# Patient Record
Sex: FEMALE | Race: WHITE | NOT HISPANIC OR LATINO
[De-identification: names, ages, dates, MRNs, and addresses within clinical notes are randomized per-mention and may not be internally consistent; named-entity substitution may affect disease eponyms.]

---

## 2019-08-28 PROBLEM — Z00.00 ENCOUNTER FOR PREVENTIVE HEALTH EXAMINATION: Status: ACTIVE | Noted: 2019-08-28

## 2019-08-30 ENCOUNTER — APPOINTMENT (OUTPATIENT)
Dept: GYNECOLOGIC ONCOLOGY | Facility: CLINIC | Age: 49
End: 2019-08-30
Payer: COMMERCIAL

## 2019-08-30 VITALS
RESPIRATION RATE: 16 BRPM | DIASTOLIC BLOOD PRESSURE: 96 MMHG | HEIGHT: 60 IN | SYSTOLIC BLOOD PRESSURE: 153 MMHG | WEIGHT: 163 LBS | HEART RATE: 79 BPM | BODY MASS INDEX: 32 KG/M2 | OXYGEN SATURATION: 97 %

## 2019-08-30 DIAGNOSIS — Z86.018 PERSONAL HISTORY OF OTHER BENIGN NEOPLASM: ICD-10-CM

## 2019-08-30 DIAGNOSIS — Z78.9 OTHER SPECIFIED HEALTH STATUS: ICD-10-CM

## 2019-08-30 DIAGNOSIS — Z80.3 FAMILY HISTORY OF MALIGNANT NEOPLASM OF BREAST: ICD-10-CM

## 2019-08-30 DIAGNOSIS — Z86.59 PERSONAL HISTORY OF OTHER MENTAL AND BEHAVIORAL DISORDERS: ICD-10-CM

## 2019-08-30 DIAGNOSIS — Z86.79 PERSONAL HISTORY OF OTHER DISEASES OF THE CIRCULATORY SYSTEM: ICD-10-CM

## 2019-08-30 PROCEDURE — 99245 OFF/OP CONSLTJ NEW/EST HI 55: CPT | Mod: 25

## 2019-08-30 PROCEDURE — 76857 US EXAM PELVIC LIMITED: CPT | Mod: 59

## 2019-08-30 PROCEDURE — 76830 TRANSVAGINAL US NON-OB: CPT | Mod: 59

## 2019-08-30 RX ORDER — AMLODIPINE BESYLATE 5 MG/1
TABLET ORAL
Refills: 0 | Status: ACTIVE | COMMUNITY

## 2019-08-30 RX ORDER — ESCITALOPRAM OXALATE 5 MG/1
TABLET, FILM COATED ORAL
Refills: 0 | Status: ACTIVE | COMMUNITY

## 2019-09-04 NOTE — CHIEF COMPLAINT
[FreeTextEntry1] : [facility]\par [place] [facility]\par [address] [address] [facility] [facility] [address]\[place] [address]\par

## 2019-09-04 NOTE — HISTORY OF PRESENT ILLNESS
[FreeTextEntry1] : 49 yo  via C/S, LMP , referred by Dr. Norwood, she is a previous patient of mine last seen 2013 for a large 16 week benign fibroid uterus. S/p laparoscopic supracervical hysterectomy and cystoscopy 13. She had seen her GYN for annual visit, per the pt on physical examination the patient had a polyp visualized, she was recommended to return to the office for US. US done 19 which displayed 2 solid masses on each side of the cervix, resembling fibroids in texture. Unable to visualize ovaries. Patient reports she has healed well from her previous surgery with me, she had no complications or issues with healing from surgery. She reports sexual activity and dyspareunia but reports that her dyspareunia was present prior to surgery.  Admits to vaginal discharge which was mostly white with a brown streak after PAP. Admits to having urinary frequency x 1 year, as well as stress incontinence with coughing/sneezing/laughing. Denies pelvic pain, vaginal bleeding, bloating, unintentional weight loss.\par \par Patient reports a history of DVT in L leg when she was pregnant, for which she had stent placed. \par \par Lpap: 2019 Results pending\par Lmammo: 2019 stable abnormalities in L breast, patient returns q 6 months\par Lcolonoscopy:has script

## 2019-09-04 NOTE — END OF VISIT
[FreeTextEntry3] : Written by Hallie Jay PA-C, acting as a scribe for Dr. Hemant Stewart.\par This note accurately reflects the work and decisions made by me.\par

## 2019-09-04 NOTE — PHYSICAL EXAM
[Absent] : Uterus: Absent [Normal] : Bimanual Exam: Normal [de-identified] : Cervical polyp palpated on exam. [de-identified] : Patient was seen and examined with chaperone Hallie Jay PA-C.

## 2019-09-04 NOTE — ASSESSMENT
[FreeTextEntry1] : We discussed the need for a Pelvic MRI to further evaluate cervical fibroid. We discussed possible management including Lupron injections vs observation vs surgery. We discussed with her history of DVT she would have to have a heme/onc consult and be given Lupron under their management. We discussed that I favor observation as long as MRI is within normal limits and patients symptoms do not interfere with her quality of life. Discussed that a surgery would be my least favorable option since we are uncertain of the amount of scar tissue that has developed from previous surgeries.

## 2019-09-06 ENCOUNTER — OTHER (OUTPATIENT)
Age: 49
End: 2019-09-06

## 2019-09-13 ENCOUNTER — APPOINTMENT (OUTPATIENT)
Dept: GYNECOLOGIC ONCOLOGY | Facility: CLINIC | Age: 49
End: 2019-09-13

## 2019-10-18 ENCOUNTER — APPOINTMENT (OUTPATIENT)
Dept: GYNECOLOGIC ONCOLOGY | Facility: CLINIC | Age: 49
End: 2019-10-18
Payer: COMMERCIAL

## 2019-10-18 VITALS
BODY MASS INDEX: 32.98 KG/M2 | RESPIRATION RATE: 16 BRPM | HEART RATE: 82 BPM | OXYGEN SATURATION: 98 % | HEIGHT: 60 IN | WEIGHT: 168 LBS

## 2019-10-18 PROCEDURE — 99214 OFFICE O/P EST MOD 30 MIN: CPT

## 2019-11-04 NOTE — ASSESSMENT
[FreeTextEntry1] : I discussed multiple options including a trachelectomy vs medically treating with lurpon vs observation. Patient states her symptoms are not affecting her quality of life and she would like to continue with observation.

## 2019-11-04 NOTE — HISTORY OF PRESENT ILLNESS
[FreeTextEntry1] : 47 y/o referred by Dr. Norwood for evaluation of a cervical fibroid. She is s/p laparoscopic supracervical hysterectomy and cystoscopy 7/2/13. On exam she had a palpable cervical polyp. I ordered a MRI pelvis to further evaluate. We discussed possible management including Lupron injections vs observation vs surgery. We discussed with her history of DVT she would have to have a heme/onc consult and be given Lupron under their management. We discussed that I favor observation as long as MRI is within normal limits and patients symptoms do not interfere with her quality of life.

## 2019-11-04 NOTE — REASON FOR VISIT
[FreeTextEntry1] : [facility]\par \par [facility] [phone] at [address] [place] [phone]\par

## 2019-11-04 NOTE — PHYSICAL EXAM
[Normal] : Mood and affect: Normal [de-identified] : Nola Mantilla MA was present the entire time of results and discussion

## 2019-11-04 NOTE — END OF VISIT
[FreeTextEntry3] : Written by Nola Mantilla, acting as a scribe for Dr. Hemant Stewart.\par This note accurately reflects the work and decisions made by me\par

## 2020-01-03 ENCOUNTER — APPOINTMENT (OUTPATIENT)
Dept: GYNECOLOGIC ONCOLOGY | Facility: CLINIC | Age: 50
End: 2020-01-03
Payer: COMMERCIAL

## 2020-01-03 PROCEDURE — 76830 TRANSVAGINAL US NON-OB: CPT | Mod: 59

## 2020-01-03 PROCEDURE — 99214 OFFICE O/P EST MOD 30 MIN: CPT | Mod: 25

## 2020-01-03 PROCEDURE — 76857 US EXAM PELVIC LIMITED: CPT | Mod: 59

## 2020-02-07 NOTE — END OF VISIT
[FreeTextEntry3] : Written by Rosalinda Aparicio, acting as a scribe for Dr. Hemant Stewart \par This note accurately reflects the work and decisions made by me.

## 2020-02-07 NOTE — HISTORY OF PRESENT ILLNESS
[FreeTextEntry1] : This 50 y/o referred by Dr. Norwood for evaluation of a cervical fibroid. She is s/p laparoscopic supracervical hysterectomy and cystoscopy 7/2/13. On exam she had a palpable cervical polyp. I ordered a MRI pelvis to further evaluate. We discussed possible management including Lupron injections vs observation vs surgery. We discussed with her history of DVT she would have to have a heme/onc consult and be given Lupron under their management. We discussed that I favor observation as long as MRI is within normal limits and patients symptoms do not interfere with her quality of life. Patient was advised to return in 3 months for a follow up ultrasound. Patient states she did not want to proceed with Lupron therapy. Patient states she feels well from a gynecological stand point.

## 2020-02-07 NOTE — PHYSICAL EXAM
[Normal] : No focal neurologic defects observed [de-identified] : Rosalinda Aparicio Medical assistant chaperoned during gynecologic exam.

## 2020-02-07 NOTE — REASON FOR VISIT
[FreeTextEntry1] : [place] [facility] \par \par [facility] [place]. [phone]\par [address] [place] [address]

## 2020-04-01 ENCOUNTER — APPOINTMENT (OUTPATIENT)
Dept: GYNECOLOGIC ONCOLOGY | Facility: CLINIC | Age: 50
End: 2020-04-01

## 2020-07-28 ENCOUNTER — APPOINTMENT (OUTPATIENT)
Dept: GYNECOLOGIC ONCOLOGY | Facility: CLINIC | Age: 50
End: 2020-07-28
Payer: COMMERCIAL

## 2020-07-28 PROCEDURE — 76857 US EXAM PELVIC LIMITED: CPT | Mod: 59

## 2020-07-28 PROCEDURE — 76830 TRANSVAGINAL US NON-OB: CPT | Mod: 59

## 2020-07-28 PROCEDURE — 99214 OFFICE O/P EST MOD 30 MIN: CPT | Mod: 25

## 2020-07-28 NOTE — REASON FOR VISIT
[FreeTextEntry1] : [place] [facility] \par \par [facility] [place]. [phone]\par [address] [place] [address] \par \par Follow up

## 2020-07-28 NOTE — DISCUSSION/SUMMARY
[FreeTextEntry1] : US shows stable cervical fibroids. Ovaries not visualized. No concerns at this time as patient continues to be asymptomatic.

## 2020-07-28 NOTE — ASSESSMENT
[FreeTextEntry1] : 48 yo s/p laparoscopic supracervical hysterectomy with cervical fibroids. Asymptomatic with ultrasound stable today. \par \par

## 2020-07-28 NOTE — PHYSICAL EXAM
[Normal] : No focal neurologic defects observed [de-identified] : Rosalinda Aparicio Medical assistant chaperoned during gynecologic exam.  [Fully active, able to carry on all pre-disease performance without restriction] : Status 0 - Fully active, able to carry on all pre-disease performance without restriction

## 2020-07-28 NOTE — HISTORY OF PRESENT ILLNESS
[FreeTextEntry1] : This 50 y/o referred by Dr. Norwood for evaluation of a cervical fibroid. She is s/p laparoscopic supracervical hysterectomy and cystoscopy 7/2/13. On exam she had a palpable cervical polyp. I ordered a MRI pelvis to further evaluate. We discussed possible management including Lupron injections vs observation vs surgery. We discussed with her history of DVT she would have to have a heme/onc consult and be given Lupron under their management. We discussed that I favor observation as long as the fibroids remain stable. She presents for follow up ultrasound. No new gynecologic issues. No changes in medical history.

## 2021-01-22 ENCOUNTER — APPOINTMENT (OUTPATIENT)
Dept: GYNECOLOGIC ONCOLOGY | Facility: CLINIC | Age: 51
End: 2021-01-22
Payer: COMMERCIAL

## 2021-01-22 DIAGNOSIS — D21.9 BENIGN NEOPLASM OF CONNECTIVE AND OTHER SOFT TISSUE, UNSPECIFIED: ICD-10-CM

## 2021-01-22 PROCEDURE — 99213 OFFICE O/P EST LOW 20 MIN: CPT | Mod: 25

## 2021-01-22 PROCEDURE — 76857 US EXAM PELVIC LIMITED: CPT | Mod: 59

## 2021-01-22 PROCEDURE — 76830 TRANSVAGINAL US NON-OB: CPT | Mod: 59

## 2021-01-22 PROCEDURE — 99072 ADDL SUPL MATRL&STAF TM PHE: CPT

## 2021-01-26 PROBLEM — D21.9 FIBROIDS: Status: ACTIVE | Noted: 2019-08-30

## 2021-01-29 NOTE — REASON FOR VISIT
[FreeTextEntry1] : [place] [facility] \par \par [facility] [place]. [phone]\par [address] [place] [address]

## 2021-01-29 NOTE — ASSESSMENT
[FreeTextEntry1] : ultrasound performed in office revealed 2 stable fibroids. \par \par I discussed with patient that her ultrasound today remains stable. In light of stable findings I will be discharging the patient from my practice to resume routine gynecological care with her primary gynecologist. I recommended patient have a follow up ultrasound in July. Patient understands that if there is any growth she should return to my office. Patient stated she understood and agreed to comply.

## 2021-01-29 NOTE — HISTORY OF PRESENT ILLNESS
[FreeTextEntry1] : This 50 year old returns to the office today for a follow up ultrasound to reevaluate cervical fibroid. Patient is s/p supracervical hysterectomy and cystoscopy on 7/2/13. Patient denies any pain or VB. Patient states she feels well from a gynecological stand point.

## 2021-01-29 NOTE — PHYSICAL EXAM
[Normal] : No focal neurologic defects observed [de-identified] : Rosalinda Aparicio Medical assistant chaperoned during results and discussion.

## 2021-04-15 ENCOUNTER — APPOINTMENT (OUTPATIENT)
Dept: SURGERY | Facility: CLINIC | Age: 51
End: 2021-04-15
Payer: COMMERCIAL

## 2021-04-15 VITALS
HEIGHT: 60 IN | DIASTOLIC BLOOD PRESSURE: 85 MMHG | HEART RATE: 90 BPM | BODY MASS INDEX: 32.98 KG/M2 | WEIGHT: 168 LBS | SYSTOLIC BLOOD PRESSURE: 135 MMHG

## 2021-04-15 DIAGNOSIS — Z83.49 FAMILY HISTORY OF OTHER ENDOCRINE, NUTRITIONAL AND METABOLIC DISEASES: ICD-10-CM

## 2021-04-15 DIAGNOSIS — Z86.718 PERSONAL HISTORY OF OTHER VENOUS THROMBOSIS AND EMBOLISM: ICD-10-CM

## 2021-04-15 PROCEDURE — 99072 ADDL SUPL MATRL&STAF TM PHE: CPT

## 2021-04-15 PROCEDURE — 99204 OFFICE O/P NEW MOD 45 MIN: CPT

## 2021-04-18 PROBLEM — Z83.49 FAMILY HISTORY OF HYPOTHYROIDISM: Status: ACTIVE | Noted: 2021-04-18

## 2021-04-18 PROBLEM — Z86.718 HISTORY OF DEEP VENOUS THROMBOSIS: Status: RESOLVED | Noted: 2021-04-18 | Resolved: 2021-04-18

## 2021-04-18 NOTE — HISTORY OF PRESENT ILLNESS
[de-identified] : Patient referred by Dr. Esposito for evaluation of suspicious thyroid nodule in a multinodular goiter.  Patient reports over a 10 year history of thyroid nodules with prior benign biopsy. Patient fell October 2020 and has been treated by a chiropractor for pain management. On MRI, a mass pushing on her trachea was identified. Patient reports occasional dysphagia with occasional change in voice, and shortness of breath. Patient denies radiation exposure. Thyroid ultrasound, March 2021: Right lobe 5.2 x 1.8 x 2 CM with subcentimeter nodules, largest 8 mm. Left lobe 5.8 x 2.2 x 3.1 CM with mid 2.9 x 3.4 x 2.5 CM nodule. Normal-appearing bilateral level II lymph nodes. Biopsy, left nodule, April 7, 2021: AUS, no sample for molecular testing obtained. History of hypothyroidism for over 25 years, TSH 0.3, total T4 11.8, total T3 110, calcium 9.4. I have reviewed all old and new data and available images.

## 2021-04-18 NOTE — ASSESSMENT
[FreeTextEntry1] : Patient with atypical left thyroid nodule with increasing symptoms in the background of hypothyroidism. I have recommended a total thyroidectomy for definitive diagnosis and relief of symptoms.  I have reviewed the pathophysiology of the disease process, the area anatomy and the rationale for surgery.  I have discussed the risks, benefits and alternative treatments which include but are not limited to bleeding, infection, numbness, hoarseness, hypocalcemia, scarring, and need for reoperation. I have answered the patient's questions to their satisfaction. The patient wishes to proceed with recommended procedure.They will contact my office to schedule surgery.  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

## 2021-04-18 NOTE — REASON FOR VISIT
[Initial Consultation] : an initial consultation for [Other: _____] : [unfilled] [FreeTextEntry2] : atypical thyroid nodule in MNG

## 2021-04-18 NOTE — PHYSICAL EXAM
[de-identified] : no cervical or supraclavicular adenopathy, trachea deviated to the right, thyroid without enlargement , with left 3.5 cm palpable mass [Normal] : no neck adenopathy [de-identified] : Skin:  normal appearance.  no rash, nodules, vesicles, or erythema,\par Musculoskeletal:  full range of motion and no deformities appreciated\par Neurological:  grossly intact\par Psychiatric:  oriented to person, place and time with appropriate affect

## 2021-04-18 NOTE — CONSULT LETTER
[Dear  ___] : Dear  [unfilled], [Consult Letter:] : I had the pleasure of evaluating your patient, [unfilled]. [Please see my note below.] : Please see my note below. [Consult Closing:] : Thank you very much for allowing me to participate in the care of this patient.  If you have any questions, please do not hesitate to contact me. [FreeTextEntry2] : Dr. Jose Esposito [FreeTextEntry3] : Sincerely yours,\par \par Jyotsna Jones MD, FACS\par Assistant Professor of Surgery and Otolaryngology\par [facility] [DrMary Kay  ___] : Dr. PADILLA

## 2021-04-27 ENCOUNTER — RESULT REVIEW (OUTPATIENT)
Age: 51
End: 2021-04-27

## 2021-05-19 ENCOUNTER — OUTPATIENT (OUTPATIENT)
Dept: OUTPATIENT SERVICES | Facility: HOSPITAL | Age: 51
LOS: 1 days | End: 2021-05-19
Payer: COMMERCIAL

## 2021-05-19 VITALS
RESPIRATION RATE: 20 BRPM | WEIGHT: 167.99 LBS | HEIGHT: 60 IN | OXYGEN SATURATION: 98 % | HEART RATE: 80 BPM | TEMPERATURE: 99 F | DIASTOLIC BLOOD PRESSURE: 90 MMHG | SYSTOLIC BLOOD PRESSURE: 150 MMHG

## 2021-05-19 DIAGNOSIS — Z90.711 ACQUIRED ABSENCE OF UTERUS WITH REMAINING CERVICAL STUMP: Chronic | ICD-10-CM

## 2021-05-19 DIAGNOSIS — Z98.890 OTHER SPECIFIED POSTPROCEDURAL STATES: Chronic | ICD-10-CM

## 2021-05-19 DIAGNOSIS — I82.409 ACUTE EMBOLISM AND THROMBOSIS OF UNSPECIFIED DEEP VEINS OF UNSPECIFIED LOWER EXTREMITY: Chronic | ICD-10-CM

## 2021-05-19 DIAGNOSIS — Z98.891 HISTORY OF UTERINE SCAR FROM PREVIOUS SURGERY: Chronic | ICD-10-CM

## 2021-05-19 DIAGNOSIS — D49.7 NEOPLASM OF UNSPECIFIED BEHAVIOR OF ENDOCRINE GLANDS AND OTHER PARTS OF NERVOUS SYSTEM: ICD-10-CM

## 2021-05-19 DIAGNOSIS — E03.9 HYPOTHYROIDISM, UNSPECIFIED: ICD-10-CM

## 2021-05-19 DIAGNOSIS — R06.83 SNORING: ICD-10-CM

## 2021-05-19 DIAGNOSIS — R09.89 OTHER SPECIFIED SYMPTOMS AND SIGNS INVOLVING THE CIRCULATORY AND RESPIRATORY SYSTEMS: ICD-10-CM

## 2021-05-19 LAB
ANION GAP SERPL CALC-SCNC: 16 MMOL/L — HIGH (ref 7–14)
BUN SERPL-MCNC: 15 MG/DL — SIGNIFICANT CHANGE UP (ref 7–23)
CALCIUM SERPL-MCNC: 9.9 MG/DL — SIGNIFICANT CHANGE UP (ref 8.4–10.5)
CHLORIDE SERPL-SCNC: 101 MMOL/L — SIGNIFICANT CHANGE UP (ref 98–107)
CO2 SERPL-SCNC: 21 MMOL/L — LOW (ref 22–31)
CREAT SERPL-MCNC: 0.65 MG/DL — SIGNIFICANT CHANGE UP (ref 0.5–1.3)
GLUCOSE SERPL-MCNC: 78 MG/DL — SIGNIFICANT CHANGE UP (ref 70–99)
HCT VFR BLD CALC: 42.2 % — SIGNIFICANT CHANGE UP (ref 34.5–45)
HGB BLD-MCNC: 13.9 G/DL — SIGNIFICANT CHANGE UP (ref 11.5–15.5)
MCHC RBC-ENTMCNC: 29 PG — SIGNIFICANT CHANGE UP (ref 27–34)
MCHC RBC-ENTMCNC: 32.9 GM/DL — SIGNIFICANT CHANGE UP (ref 32–36)
MCV RBC AUTO: 87.9 FL — SIGNIFICANT CHANGE UP (ref 80–100)
NRBC # BLD: 0 /100 WBCS — SIGNIFICANT CHANGE UP
NRBC # FLD: 0 K/UL — SIGNIFICANT CHANGE UP
PLATELET # BLD AUTO: 386 K/UL — SIGNIFICANT CHANGE UP (ref 150–400)
POTASSIUM SERPL-MCNC: 3.6 MMOL/L — SIGNIFICANT CHANGE UP (ref 3.5–5.3)
POTASSIUM SERPL-SCNC: 3.6 MMOL/L — SIGNIFICANT CHANGE UP (ref 3.5–5.3)
RBC # BLD: 4.8 M/UL — SIGNIFICANT CHANGE UP (ref 3.8–5.2)
RBC # FLD: 12.3 % — SIGNIFICANT CHANGE UP (ref 10.3–14.5)
SODIUM SERPL-SCNC: 138 MMOL/L — SIGNIFICANT CHANGE UP (ref 135–145)
WBC # BLD: 11.93 K/UL — HIGH (ref 3.8–10.5)
WBC # FLD AUTO: 11.93 K/UL — HIGH (ref 3.8–10.5)

## 2021-05-19 PROCEDURE — 93010 ELECTROCARDIOGRAM REPORT: CPT

## 2021-05-19 RX ORDER — SODIUM CHLORIDE 9 MG/ML
1000 INJECTION, SOLUTION INTRAVENOUS
Refills: 0 | Status: DISCONTINUED | OUTPATIENT
Start: 2021-05-26 | End: 2021-06-09

## 2021-05-19 RX ORDER — SODIUM CHLORIDE 9 MG/ML
3 INJECTION INTRAMUSCULAR; INTRAVENOUS; SUBCUTANEOUS ONCE
Refills: 0 | Status: DISCONTINUED | OUTPATIENT
Start: 2021-05-26 | End: 2021-06-09

## 2021-05-19 RX ORDER — ESCITALOPRAM OXALATE 10 MG/1
1 TABLET, FILM COATED ORAL
Qty: 0 | Refills: 0 | DISCHARGE

## 2021-05-19 NOTE — H&P PST ADULT - HISTORY OF PRESENT ILLNESS
50 year old female with hx of thyroid nodules, states she had biopsy which has been negative in the past, however she states she had recent MRI due to neck pain and found to have nodule that is pressing into her trachea. Pt had biopsy done and presents today for presurgical evaluation for ... 50 year old female with hx of thyroid nodules, states she had biopsy which has been negative in the past, however she states she had recent MRI due to neck pain and found to have nodule that is pressing into her trachea. Pt had biopsy done and presents today for presurgical evaluation for Total Thyroidectomy with Closure.

## 2021-05-19 NOTE — H&P PST ADULT - NSICDXPASTMEDICALHX_GEN_ALL_CORE_FT
PAST MEDICAL HISTORY:  Anxiety     DVT, lower extremity left leg 1999 - treated with urokinase, coumadin, stent placement    HTN (hypertension)     Hypothyroid     Kidney stones     Uterine leiomyoma

## 2021-05-19 NOTE — H&P PST ADULT - NSICDXPROBLEM_GEN_ALL_CORE_FT
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  Pre-op instructions provided. Pt verbalized understanding.   Pepcid provided for GI prophylaxis.   Pt given detailed verbal and written instructions on chlorhexidine wash. Pt verbalized understanding with teachback.   Pt states she is going for medical clearance per surgeon's request - requested copy (due to hx of DVT/vascular stent - not on aspirin).    Problem: Snoring  Assessment and Plan: SHY precautions. Pt with >3 criteria on STOP-Bang Questionairre. OR booking notified.     Problem: Hypothyroidism  Assessment and Plan: Pt instructed to take her Levothyroxine the morning of surgery.

## 2021-05-19 NOTE — H&P PST ADULT - NSICDXFAMILYHX_GEN_ALL_CORE_FT
FAMILY HISTORY:  Father  Still living? Unknown  FH: heart disease, Age at diagnosis: Age Unknown    Mother  Still living? Unknown  FH: breast cancer, Age at diagnosis: Age Unknown    Sibling  Still living? Unknown  FH: heart disease, Age at diagnosis: Age Unknown

## 2021-05-19 NOTE — H&P PST ADULT - CARDIOVASCULAR COMMENTS
reports she has a vascular stent that was placed after her DVT - states she was put on baby aspirin but stopped taking it years ago

## 2021-05-19 NOTE — H&P PST ADULT - NSALCOHOLAMT_GEN_A_CORE_SD
He needs to see a psychiatrist ASAP  If he doesn't have one then he should proceed to the ER  He was last there for dehydration and kidney function was not great  He should be treated for his psychiatric needs ASAP  Needs to continue the Emgality until he is back on his psychiatric medications as this can cause headaches as well  1-2 drinks

## 2021-05-19 NOTE — H&P PST ADULT - NSICDXPASTSURGICALHX_GEN_ALL_CORE_FT
PAST SURGICAL HISTORY:  DVT, lower extremity vascular stent placed     H/O  section x2    H/O lithotripsy     S/P arthroscopy of knee right 2017    S/P partial hysterectomy

## 2021-05-19 NOTE — H&P PST ADULT - LAST ECHOCARDIOGRAM
Visit Information Date & Time Provider Department Dept. Phone Encounter #  
 2/10/2017  8:30 AM Arsh Lin [place] [address] [address] [id number] Follow-up Instructions Return in about 3 months (around 5/10/2017). Upcoming Health Maintenance Date Due Hepatitis C Screening 1959 DTaP/Tdap/Td series (1 - Tdap) 11/18/1980 PAP AKA CERVICAL CYTOLOGY 11/18/1980 BREAST CANCER SCRN MAMMOGRAM 11/18/2009 FOBT Q 1 YEAR AGE 50-75 11/18/2009 INFLUENZA AGE 9 TO ADULT 8/1/2016 Allergies as of 2/10/2017  Review Complete On: 2/10/2017 By: Arsh Lin MD  
 No Known Allergies Current Immunizations  Never Reviewed No immunizations on file. Not reviewed this visit You Were Diagnosed With   
  
 Codes Comments Essential hypertension    -  Primary ICD-10-CM: I10 
ICD-9-CM: 401.9 Acquired hypothyroidism     ICD-10-CM: E03.9 ICD-9-CM: 244.9 Adjustment disorder with mixed anxiety and depressed mood     ICD-10-CM: F43.23 
ICD-9-CM: 309.28 Primary osteoarthritis of right hip     ICD-10-CM: M16.11 
ICD-9-CM: 715.15 Bursitis, hip, right     ICD-10-CM: M70.71 ICD-9-CM: 726.5 Vitals BP Pulse Resp Weight(growth percentile) SpO2 BMI  
 140/90 (BP 1 Location: Right arm, BP Patient Position: Sitting) 70 18 205 lb (93 kg) 98% 40.04 kg/m2 OB Status Smoking Status Hysterectomy Former Smoker BMI and BSA Data Body Mass Index Body Surface Area 40.04 kg/m 2 1.98 m 2 Preferred Pharmacy Pharmacy Name Phone [facility] \[facility] [place] [facility]\"" [address], [place] - [address] [phone] Your Updated Medication List  
  
   
This list is accurate as of: 2/10/17  8:52 AM.  Always use your most recent med list.  
  
  
  
  
 fluticasone 50 mcg/actuation nasal spray Commonly known as:  FLONASE  
2 sprays by Both Nostrils route daily. For sinus and drip levothyroxine 75 mcg tablet Commonly known as:  SYNTHROID Take 1 Tab by mouth daily. Indications: low thyroid  
  
 lisinopril-hydroCHLOROthiazide 10-12.5 mg per tablet Commonly known as:  Camella Rasp Take 1 Tab by mouth daily. Indications: pressure  
  
 meloxicam 15 mg tablet Commonly known as:  MOBIC  
TAKE ONE TABLET BY MOUTH ONCE DAILY  
  
 triamcinolone acetonide 10 mg/mL injection Commonly known as:  KENALOG 2 mL by IntraMUSCular route once for 1 dose. Prescriptions Sent to Pharmacy Refills  
 lisinopril-hydroCHLOROthiazide (PRINZIDE, ZESTORETIC) 10-12.5 mg per tablet 3 Sig: Take 1 Tab by mouth daily. Indications: pressure Class: Normal  
 Pharmacy: [facility] [address], [address] [place] [address] [facility] Ph #: [phone] Route: Oral  
 levothyroxine (SYNTHROID) 75 mcg tablet 3 Sig: Take 1 Tab by mouth daily. Indications: low thyroid Class: Normal  
 Pharmacy: [facility] [address], [address] [place] [address] [facility] Ph #: [phone] Route: Oral  
  
We Performed the Following DRAIN/INJECT LARGE JOINT/BURSA [id number] CPT(R)] LIPID PANEL [45984 CPT(R)] METABOLIC PANEL, COMPREHENSIVE [17121 CPT(R)] HI THER/PROPH/DIAG INJECTION, SUBCUT/IM [id number] CPT(R)] TRIAMCINOLONE ACETONIDE INJ [ [facility]] TSH RFX ON ABNORMAL TO FREE T4 [[id number] Custom] Follow-up Instructions Return in about 3 months (around 5/10/2017). Patient Instructions Trochanteric Bursitis: Exercises Your Care Instructions Here are some examples of typical rehabilitation exercises for your condition. Start each exercise slowly. Ease off the exercise if you start to have pain. Your doctor or physical therapist will tell you when you can start these exercises and which ones will work best for you. How to do the exercises Hamstring wall stretch 1.  Lie on your back in a doorway, with your good leg through the open door. 2. Slide your affected leg up the wall to straighten your knee. You should feel a gentle stretch down the back of your leg. ¨ Do not arch your back. ¨ Do not bend either knee. ¨ Keep one heel touching the floor and the other heel touching the wall. Do not point your toes. 3. Hold the stretch for at least 1 minute to begin. Then try to lengthen the time you hold the stretch to as long as 6 minutes. 4. Repeat 2 to 4 times. If you do not have a place to do this exercise in a doorway, there is another way to do it: 1. Lie on your back, and bend the knee of your affected leg. 2. Loop a towel under the ball and toes of that foot, and hold the ends of the towel in your hands. 3. Straighten your knee, and slowly pull back on the towel. You should feel a gentle stretch down the back of your leg. 4. Hold the stretch for 15 to 30 seconds. Or even better, hold the stretch for 1 minute if you can. 5. Repeat 2 to 4 times. Straight-leg raises to the outside 1. Lie on your side, with your affected leg on top. 2. Tighten the front thigh muscles of your top leg to keep your knee straight. 3. Keep your hip and your leg straight in line with the rest of your body, and keep your knee pointing forward. Do not drop your hip back. 4. Lift your top leg straight up toward the ceiling, about 12 inches off the floor. Hold for about 6 seconds, then slowly lower your leg. 5. Repeat 8 to 12 times. Clamshell 1. Lie on your side, with your affected leg on top and your head propped on a pillow. Keep your feet and knees together and your knees bent. 2. Raise your top knee, but keep your feet together. Do not let your hips roll back. Your legs should open up like a clamshell. 3. Hold for 6 seconds. 4. Slowly lower your knee back down. Rest for 10 seconds. 5. Repeat 8 to 12 times. Standing quadriceps stretch 1.  If you are not steady on your feet, hold on to a chair, counter, or wall. You can also lie on your stomach or your side to do this exercise. 2. Bend the knee of the leg you want to stretch, and reach behind you to grab the front of your foot or ankle with the hand on the same side. For example, if you are stretching your right leg, use your right hand. 3. Keeping your knees next to each other, pull your foot toward your buttock until you feel a gentle stretch across the front of your hip and down the front of your thigh. Your knee should be pointed directly to the ground, and not out to the side. 4. Hold the stretch for 15 to 30 seconds. 5. Repeat 2 to 4 times. Piriformis stretch 1. Lie on your back with your legs straight. 2. Lift your affected leg and bend your knee. With your opposite hand, reach across your body, and then gently pull your knee toward your opposite shoulder. 3. Hold the stretch for 15 to 30 seconds. 4. Repeat 2 to 4 times. Double knee-to-chest 
 
1. Lie on your back with your knees bent and your feet flat on the floor. You can put a small pillow under your head and neck if it is more comfortable. 2. Bring both knees to your chest. 
3. Keep your lower back pressed to the floor. Hold for 15 to 30 seconds. 4. Relax, and lower your knees to the starting position. 5. Repeat 2 to 4 times. Follow-up care is a key part of your treatment and safety. Be sure to make and go to all appointments, and call your doctor if you are having problems. It's also a good idea to know your test results and keep a list of the medicines you take. Where can you learn more? Go to http://isak-le.info/. Enter S253 in the search box to learn more about \"Trochanteric Bursitis: Exercises. \" Current as of: May 23, 2016 Content Version: 11.1 © 5619-3080 [employer].  Care instructions adapted under license by [employer] (which disclaims liability or warranty for this information). If you have questions about a medical condition or this instruction, always ask your healthcare professional. [address] any warranty or liability for your use of this information. If you have any questions regarding Synthox, you may call [employer] support at [phone]. Introducing [facility]! Chiquita Sheliajanae introduces [employer] patient portal. Now you can access parts of your medical record, email your doctor's office, and request medication refills online. 1. In your internet browser, go to [email or web address] 2. Click on the First Time User? Click Here link in the Sign In box. You will see the New Member Sign Up page. 3. Enter your [employer] Access Code exactly as it appears below. You will not need to use this code after youve completed the sign-up process. If you do not sign up before the expiration date, you must request a new code. · [employer] Access Code: [email or web address] Expires: 5/11/2017  7:54 AM 
 
4. Enter the last four digits of your Social Security Number (xxxx) and Date of Birth (mm/dd/yyyy) as indicated and click Submit. You will be taken to the next sign-up page. 5. Create a GelSight ID. This will be your [employer] login ID and cannot be changed, so think of one that is secure and easy to remember. 6. Create a [employer] password. You can change your password at any time. 7. Enter your Password Reset Question and Answer. This can be used at a later time if you forget your password. 8. Enter your e-mail address. You will receive e-mail notification when new information is available in [address] 9. Click Sign Up. You can now view and download portions of your medical record. 10. Click the Download Summary menu link to download a portable copy of your medical information.  
 
If you have questions, please visit the Frequently Asked Questions section of the [employer]. Remember, "[employer]"hart is NOT to be used for urgent needs. For medical emergencies, dial 911. Now available from your iPhone and Android! Please provide this summary of care documentation to your next provider. Your primary care clinician is listed as Sheri Gross. If you have any questions after today's visit, please call [phone]. denies

## 2021-05-22 DIAGNOSIS — Z01.818 ENCOUNTER FOR OTHER PREPROCEDURAL EXAMINATION: ICD-10-CM

## 2021-05-23 ENCOUNTER — APPOINTMENT (OUTPATIENT)
Dept: DISASTER EMERGENCY | Facility: CLINIC | Age: 51
End: 2021-05-23

## 2021-05-23 LAB — SARS-COV-2 N GENE NPH QL NAA+PROBE: NOT DETECTED

## 2021-05-25 ENCOUNTER — TRANSCRIPTION ENCOUNTER (OUTPATIENT)
Age: 51
End: 2021-05-25

## 2021-05-25 NOTE — ASU PATIENT PROFILE, ADULT - PMH
Anxiety    DVT, lower extremity  left leg 1999 - treated with urokinase, coumadin, stent placement  HTN (hypertension)    Hypothyroid    Kidney stones    Uterine leiomyoma

## 2021-05-25 NOTE — ASU PATIENT PROFILE, ADULT - PSH
DVT, lower extremity  vascular stent placed   H/O  section  x2  H/O lithotripsy    S/P arthroscopy of knee  right 2017  S/P partial hysterectomy

## 2021-05-26 ENCOUNTER — RESULT REVIEW (OUTPATIENT)
Age: 51
End: 2021-05-26

## 2021-05-26 ENCOUNTER — OUTPATIENT (OUTPATIENT)
Dept: OUTPATIENT SERVICES | Facility: HOSPITAL | Age: 51
LOS: 1 days | Discharge: HOME | End: 2021-05-26
Payer: COMMERCIAL

## 2021-05-26 ENCOUNTER — APPOINTMENT (OUTPATIENT)
Dept: SURGERY | Facility: HOSPITAL | Age: 51
End: 2021-05-26

## 2021-05-26 VITALS
RESPIRATION RATE: 18 BRPM | DIASTOLIC BLOOD PRESSURE: 89 MMHG | WEIGHT: 167.99 LBS | HEART RATE: 89 BPM | OXYGEN SATURATION: 100 % | SYSTOLIC BLOOD PRESSURE: 153 MMHG | HEIGHT: 60 IN | TEMPERATURE: 99 F

## 2021-05-26 VITALS
OXYGEN SATURATION: 100 % | RESPIRATION RATE: 16 BRPM | SYSTOLIC BLOOD PRESSURE: 116 MMHG | DIASTOLIC BLOOD PRESSURE: 60 MMHG | HEART RATE: 87 BPM

## 2021-05-26 DIAGNOSIS — I82.409 ACUTE EMBOLISM AND THROMBOSIS OF UNSPECIFIED DEEP VEINS OF UNSPECIFIED LOWER EXTREMITY: Chronic | ICD-10-CM

## 2021-05-26 DIAGNOSIS — Z98.890 OTHER SPECIFIED POSTPROCEDURAL STATES: Chronic | ICD-10-CM

## 2021-05-26 DIAGNOSIS — Z90.711 ACQUIRED ABSENCE OF UTERUS WITH REMAINING CERVICAL STUMP: Chronic | ICD-10-CM

## 2021-05-26 DIAGNOSIS — D49.7 NEOPLASM OF UNSPECIFIED BEHAVIOR OF ENDOCRINE GLANDS AND OTHER PARTS OF NERVOUS SYSTEM: ICD-10-CM

## 2021-05-26 DIAGNOSIS — Z98.891 HISTORY OF UTERINE SCAR FROM PREVIOUS SURGERY: Chronic | ICD-10-CM

## 2021-05-26 PROCEDURE — 13132 CMPLX RPR F/C/C/M/N/AX/G/H/F: CPT | Mod: 59

## 2021-05-26 PROCEDURE — 88307 TISSUE EXAM BY PATHOLOGIST: CPT | Mod: 26

## 2021-05-26 PROCEDURE — 60252 REMOVAL OF THYROID: CPT

## 2021-05-26 RX ORDER — OXYCODONE HYDROCHLORIDE 5 MG/1
10 TABLET ORAL ONCE
Refills: 0 | Status: DISCONTINUED | OUTPATIENT
Start: 2021-05-26 | End: 2021-05-26

## 2021-05-26 RX ORDER — ONDANSETRON 8 MG/1
4 TABLET, FILM COATED ORAL ONCE
Refills: 0 | Status: DISCONTINUED | OUTPATIENT
Start: 2021-05-26 | End: 2021-06-09

## 2021-05-26 RX ORDER — HYDROMORPHONE HYDROCHLORIDE 2 MG/ML
0.5 INJECTION INTRAMUSCULAR; INTRAVENOUS; SUBCUTANEOUS
Refills: 0 | Status: DISCONTINUED | OUTPATIENT
Start: 2021-05-26 | End: 2021-05-26

## 2021-05-26 RX ORDER — HYDROMORPHONE HYDROCHLORIDE 2 MG/ML
1 INJECTION INTRAMUSCULAR; INTRAVENOUS; SUBCUTANEOUS
Refills: 0 | Status: DISCONTINUED | OUTPATIENT
Start: 2021-05-26 | End: 2021-05-26

## 2021-05-26 RX ORDER — BENZOCAINE AND MENTHOL 5; 1 G/100ML; G/100ML
1 LIQUID ORAL
Qty: 0 | Refills: 0 | DISCHARGE
Start: 2021-05-26

## 2021-05-26 RX ORDER — AMLODIPINE BESYLATE 2.5 MG/1
1 TABLET ORAL
Qty: 0 | Refills: 0 | DISCHARGE

## 2021-05-26 RX ORDER — OXYCODONE HYDROCHLORIDE 5 MG/1
5 TABLET ORAL ONCE
Refills: 0 | Status: DISCONTINUED | OUTPATIENT
Start: 2021-05-26 | End: 2021-05-26

## 2021-05-26 RX ORDER — LEVOTHYROXINE SODIUM 125 MCG
1 TABLET ORAL
Qty: 0 | Refills: 0 | DISCHARGE

## 2021-05-26 RX ORDER — ESCITALOPRAM OXALATE 10 MG/1
1 TABLET, FILM COATED ORAL
Qty: 0 | Refills: 0 | DISCHARGE

## 2021-05-26 RX ORDER — BENZOCAINE AND MENTHOL 5; 1 G/100ML; G/100ML
1 LIQUID ORAL
Refills: 0 | Status: DISCONTINUED | OUTPATIENT
Start: 2021-05-26 | End: 2021-06-09

## 2021-05-26 RX ORDER — IBUPROFEN 200 MG
1 TABLET ORAL
Qty: 0 | Refills: 0 | DISCHARGE

## 2021-05-26 RX ORDER — ACETAMINOPHEN 500 MG
2 TABLET ORAL
Qty: 0 | Refills: 0 | DISCHARGE
Start: 2021-05-26

## 2021-05-26 RX ORDER — ACETAMINOPHEN 500 MG
650 TABLET ORAL EVERY 6 HOURS
Refills: 0 | Status: DISCONTINUED | OUTPATIENT
Start: 2021-05-26 | End: 2021-06-09

## 2021-05-26 RX ADMIN — SODIUM CHLORIDE 30 MILLILITER(S): 9 INJECTION, SOLUTION INTRAVENOUS at 12:52

## 2021-05-26 RX ADMIN — BENZOCAINE AND MENTHOL 1 LOZENGE: 5; 1 LIQUID ORAL at 12:50

## 2021-05-26 RX ADMIN — SODIUM CHLORIDE 30 MILLILITER(S): 9 INJECTION, SOLUTION INTRAVENOUS at 13:53

## 2021-05-26 RX ADMIN — Medication 2 TABLET(S): at 13:53

## 2021-05-26 NOTE — ASU DISCHARGE PLAN (ADULT/PEDIATRIC) - NURSING INSTRUCTIONS
You received IV Tylenol for pain management at ___. Please DO NOT take any Tylenol (Acetaminophen) containing products, such as Vicodin, Percocet, Excedrin, and cold medications for the next 6 hours (until ___ PM). DO NOT TAKE MORE THAN 3000 MG OF TYLENOL in a 24 hour period. You received IV Tylenol for pain management at 9:15am. Please DO NOT take any Tylenol (Acetaminophen) containing products, such as Vicodin, Percocet, Excedrin, and cold medications for the next 6 hours (until 3:15PM). DO NOT TAKE MORE THAN 3000 MG OF TYLENOL in a 24 hour period.

## 2021-05-26 NOTE — ASU PREOP CHECKLIST - COMMENTS
pt took levothyroxine and famotidine today with sip water pt took levothyroxine at 0615 and famotidine at 0645 today with sip water

## 2021-05-26 NOTE — ASU DISCHARGE PLAN (ADULT/PEDIATRIC) - PROVIDER TOKENS
PROVIDER:[TOKEN:[[id number]],SCHEDULEDAPPT:[05/27/2021]] PROVIDER:[TOKEN:[[id number]],SCHEDULEDAPPT:[05/27/2021],SCHEDULEDAPPTTIME:[04:30 PM]]

## 2021-05-26 NOTE — ASU DISCHARGE PLAN (ADULT/PEDIATRIC) - CARE PROVIDER_API CALL
Jyotsna Jones (MD)  Surgery  [address], [address]  [place] [address]  Phone: [phone]  Fax: [phone]  Scheduled Appointment: 05/27/2021   Jyotsna Jones)  Surgery  [address] [place] [address], [address]  [place] [address]  Phone: [phone]  Fax: [phone]  Scheduled Appointment: 05/27/2021 04:30 PM

## 2021-05-26 NOTE — ASU DISCHARGE PLAN (ADULT/PEDIATRIC) - ASU DC SPECIAL INSTRUCTIONSFT
Keep dressings dry for 3 days, then may shower with steri strips.   Wear supportive bra for 2 weeks, day and night to reduce tension on incision.  TAKE OTC CALCIUM (500mg or 600mg) + Vit D as directed, 2 tabs every 8 hours (3 times a day).  Continue Levothyroxine 150mcg as directed.   Avoid NSAIDs (Advil, Motrin, Ibuprofen) for 48 hours.   Take OTC Tylenol 325mg, 2 tabs every 6 hours as needed for pain.   May take OTC Lozenges (Cepacol) 1 tab every 4 hours for sore throat as needed.  Ice pack to neck for comfort as needed.   *Follow up with Dr. Jones tomorrow (05/27/2021 at ???) for drain removal. Please follow discharge drain removal instructions as directed until visit. Keep dressings dry for 3 days, then may shower with steri strips.   Wear supportive bra for 2 weeks, day and night to reduce tension on incision.  TAKE OTC CALCIUM (500mg or 600mg) + Vit D as directed, 2 tabs every 8 hours (3 times a day).  Continue Levothyroxine 150mcg as directed.   Avoid NSAIDs (Advil, Motrin, Ibuprofen) for 48 hours.   Take OTC Tylenol 325mg, 2 tabs every 6 hours as needed for pain.   May take OTC Lozenges (Cepacol) 1 tab every 4 hours for sore throat as needed.  Ice pack to neck for comfort as needed.   *Follow up with Dr. Jones tomorrow (05/27/2021 at 4:30pm) for drain removal. Please follow discharge drain removal instructions as directed until visit.

## 2021-05-27 ENCOUNTER — APPOINTMENT (OUTPATIENT)
Dept: SURGERY | Facility: CLINIC | Age: 51
End: 2021-05-27
Payer: COMMERCIAL

## 2021-05-27 PROBLEM — D25.9 LEIOMYOMA OF UTERUS, UNSPECIFIED: Chronic | Status: ACTIVE | Noted: 2021-05-19

## 2021-05-27 PROBLEM — N20.0 CALCULUS OF KIDNEY: Chronic | Status: ACTIVE | Noted: 2021-05-19

## 2021-05-27 PROBLEM — F41.9 ANXIETY DISORDER, UNSPECIFIED: Chronic | Status: ACTIVE | Noted: 2021-05-19

## 2021-05-27 PROBLEM — E03.9 HYPOTHYROIDISM, UNSPECIFIED: Chronic | Status: ACTIVE | Noted: 2021-05-19

## 2021-05-27 PROBLEM — I10 ESSENTIAL (PRIMARY) HYPERTENSION: Chronic | Status: ACTIVE | Noted: 2021-05-19

## 2021-05-27 PROBLEM — I82.409 ACUTE EMBOLISM AND THROMBOSIS OF UNSPECIFIED DEEP VEINS OF UNSPECIFIED LOWER EXTREMITY: Chronic | Status: ACTIVE | Noted: 2021-05-19

## 2021-05-27 PROCEDURE — 99024 POSTOP FOLLOW-UP VISIT: CPT

## 2021-05-27 NOTE — ASSESSMENT
[FreeTextEntry1] : Patient with atypical left thyroid nodule with increasing symptoms in the background of hypothyroidism.Doing well postop, drain removed, RTO 1 week

## 2021-05-27 NOTE — PHYSICAL EXAM
[de-identified] : dressing itnact, JUNIOR removed no cervical or supraclavicular adenopathy, trachea midline, no palpable masses [Normal] : no neck adenopathy [de-identified] : Skin:  normal appearance.  no rash, nodules, vesicles, or erythema,\par Musculoskeletal:  full range of motion and no deformities appreciated\par Neurological:  grossly intact\par Psychiatric:  oriented to person, place and time with appropriate affect

## 2021-05-27 NOTE — HISTORY OF PRESENT ILLNESS
[de-identified] : Patient referred by Dr. Esposito for evaluation of suspicious thyroid nodule in a multinodular goiter.  Patient reports over a 10 year history of thyroid nodules with prior benign biopsy. Patient fell October 2020 and has been treated by a chiropractor for pain management. On MRI, a mass pushing on her trachea was identified. Patient reports occasional dysphagia with occasional change in voice, and shortness of breath. Patient denies radiation exposure. Thyroid ultrasound, March 2021: Right lobe 5.2 x 1.8 x 2 CM with subcentimeter nodules, largest 8 mm. Left lobe 5.8 x 2.2 x 3.1 CM with mid 2.9 x 3.4 x 2.5 CM nodule. Normal-appearing bilateral level II lymph nodes. Biopsy, left nodule, April 7, 2021: AUS, no sample for molecular testing obtained. History of hypothyroidism for over 25 years, TSH 0.3, total T4 11.8, total T3 110, calcium 9.4.\par 5/26/21 Total thyroidectomy with CND, denies dysphagia, hoarseness or parathesias.  JUNIOR serous.

## 2021-06-03 ENCOUNTER — APPOINTMENT (OUTPATIENT)
Dept: SURGERY | Facility: CLINIC | Age: 51
End: 2021-06-03
Payer: COMMERCIAL

## 2021-06-03 ENCOUNTER — NON-APPOINTMENT (OUTPATIENT)
Age: 51
End: 2021-06-03

## 2021-06-03 DIAGNOSIS — D49.7 NEOPLASM OF UNSPECIFIED BEHAVIOR OF ENDOCRINE GLANDS AND OTHER PARTS OF NERVOUS SYSTEM: ICD-10-CM

## 2021-06-03 LAB — SURGICAL PATHOLOGY STUDY: SIGNIFICANT CHANGE UP

## 2021-06-03 PROCEDURE — 99024 POSTOP FOLLOW-UP VISIT: CPT

## 2021-06-03 RX ORDER — LEVOTHYROXINE SODIUM 150 UG/1
150 TABLET ORAL
Refills: 0 | Status: ACTIVE | COMMUNITY

## 2021-06-03 NOTE — PHYSICAL EXAM
[de-identified] : incision with mild swelling, scar min discussed.  no cervical or supraclavicular adenopathy, trachea midline, no palpable masses [Normal] : no neck adenopathy [de-identified] : Skin:  normal appearance.  no rash, nodules, vesicles, or erythema,\par Musculoskeletal:  full range of motion and no deformities appreciated\par Neurological:  grossly intact\par Psychiatric:  oriented to person, place and time with appropriate affect

## 2021-06-03 NOTE — ASSESSMENT
[FreeTextEntry1] : Patient with atypical left thyroid nodule with increasing symptoms in the background of hypothyroidism.Doing well postop, will follow up on path. RTO 6 weeks.

## 2021-06-03 NOTE — HISTORY OF PRESENT ILLNESS
[de-identified] : Patient referred by Dr. Esposito for evaluation of suspicious thyroid nodule in a multinodular goiter.  Patient reports over a 10 year history of thyroid nodules with prior benign biopsy. Patient fell October 2020 and has been treated by a chiropractor for pain management. On MRI, a mass pushing on her trachea was identified. Patient reports occasional dysphagia with occasional change in voice, and shortness of breath. Patient denies radiation exposure. Thyroid ultrasound, March 2021: Right lobe 5.2 x 1.8 x 2 CM with subcentimeter nodules, largest 8 mm. Left lobe 5.8 x 2.2 x 3.1 CM with mid 2.9 x 3.4 x 2.5 CM nodule. Normal-appearing bilateral level II lymph nodes. Biopsy, left nodule, April 7, 2021: AUS, no sample for molecular testing obtained. History of hypothyroidism for over 25 years, TSH 0.3, total T4 11.8, total T3 110, calcium 9.4.\par 5/26/21 Total thyroidectomy with CND, denies dysphagia or parathesias.  path pending. patient reports raspy voice with use.  questions answered,

## 2021-07-20 ENCOUNTER — APPOINTMENT (OUTPATIENT)
Dept: SURGERY | Facility: CLINIC | Age: 51
End: 2021-07-20
Payer: COMMERCIAL

## 2021-07-20 DIAGNOSIS — E03.9 HYPOTHYROIDISM, UNSPECIFIED: ICD-10-CM

## 2021-07-20 DIAGNOSIS — E04.2 NONTOXIC MULTINODULAR GOITER: ICD-10-CM

## 2021-07-20 PROCEDURE — 36415 COLL VENOUS BLD VENIPUNCTURE: CPT

## 2021-07-20 PROCEDURE — 99024 POSTOP FOLLOW-UP VISIT: CPT

## 2021-07-20 NOTE — HISTORY OF PRESENT ILLNESS
[de-identified] : Patient referred by Dr. Esposito for evaluation of suspicious thyroid nodule in a multinodular goiter.  Patient reports over a 10 year history of thyroid nodules with prior benign biopsy. Patient fell October 2020 and has been treated by a chiropractor for pain management. On MRI, a mass pushing on her trachea was identified. Patient reports occasional dysphagia with occasional change in voice, and shortness of breath. Patient denies radiation exposure. Thyroid ultrasound, March 2021: Right lobe 5.2 x 1.8 x 2 CM with subcentimeter nodules, largest 8 mm. Left lobe 5.8 x 2.2 x 3.1 CM with mid 2.9 x 3.4 x 2.5 CM nodule. Normal-appearing bilateral level II lymph nodes. Biopsy, left nodule, April 7, 2021: AUS, no sample for molecular testing obtained. History of hypothyroidism for over 25 years, TSH 0.3, total T4 11.8, total T3 110, calcium 9.4.\par 5/26/21 Total thyroidectomy with CND, denies dysphagia or parathesias.  path benign. patient reportsfatigue.  questions answered,

## 2021-07-20 NOTE — ASSESSMENT
[FreeTextEntry1] : Patient with atypical left thyroid nodule with increasing symptoms in the background of hypothyroidism. final path benign.  Doing well postop, beny sent, RTO 4 mo

## 2021-07-20 NOTE — PHYSICAL EXAM
[de-identified] : incision with mild tightness, scar min discussed.  no cervical or supraclavicular adenopathy, trachea midline, no palpable masses [Normal] : no neck adenopathy [de-identified] : Skin:  normal appearance.  no rash, nodules, vesicles, or erythema,\par Musculoskeletal:  full range of motion and no deformities appreciated\par Neurological:  grossly intact\par Psychiatric:  oriented to person, place and time with appropriate affect

## 2021-09-30 ENCOUNTER — NON-APPOINTMENT (OUTPATIENT)
Age: 51
End: 2021-09-30

## 2021-11-11 ENCOUNTER — APPOINTMENT (OUTPATIENT)
Dept: SURGERY | Facility: CLINIC | Age: 51
End: 2021-11-11

## 2023-10-09 ENCOUNTER — NON-APPOINTMENT (OUTPATIENT)
Age: 53
End: 2023-10-09

## 2023-10-27 ENCOUNTER — NON-APPOINTMENT (OUTPATIENT)
Age: 53
End: 2023-10-27

## 2024-01-08 ENCOUNTER — NON-APPOINTMENT (OUTPATIENT)
Age: 54
End: 2024-01-08

## 2025-03-16 ENCOUNTER — NON-APPOINTMENT (OUTPATIENT)
Age: 55
End: 2025-03-16